# Patient Record
Sex: MALE | Race: WHITE | NOT HISPANIC OR LATINO
[De-identification: names, ages, dates, MRNs, and addresses within clinical notes are randomized per-mention and may not be internally consistent; named-entity substitution may affect disease eponyms.]

---

## 2024-01-22 ENCOUNTER — TRANSCRIBE ORDERS (OUTPATIENT)
Dept: OTHER | Age: 54
End: 2024-01-22

## 2024-01-22 DIAGNOSIS — R97.20 ELEVATED PSA: Primary | ICD-10-CM

## 2024-01-26 NOTE — TELEPHONE ENCOUNTER
MEDICAL RECORDS REQUEST   [facility]  Urology Clinic  [address] [place] [address]  PHONE: [phone]  Fax: [phone]        FUTURE VISIT INFORMATION                                                   Humphrey Chris, : 1970 scheduled for future visit at [facility]    APPOINTMENT INFORMATION:  Date: 2024  Provider:  Domingo Moncada MD  Reason for Visit/Diagnosis: elevated psa    REFERRAL INFORMATION:  Referring provider:  Dr. Marcelino Avalos @ [facility]      RECORDS REQUESTED FOR VISIT                                                     NOTES  STATUS/DETAILS   OFFICE NOTE from referring provider  yes, 2024 -- Dr. Marcelino Avalos affiliated with [facility]   MEDICATION LIST  yes   LABS     URINALYSIS (UA)  yes   PSA  yes     PRE-VISIT CHECKLIST      Joint diagnostic appointment coordinated correctly          (ensure right order & amount of time) Yes   RECORD COLLECTION COMPLETE Yes

## 2024-02-09 ENCOUNTER — PATIENT OUTREACH (OUTPATIENT)
Dept: ONCOLOGY | Facility: CLINIC | Age: 54
End: 2024-02-09

## 2024-02-09 ENCOUNTER — PRE VISIT (OUTPATIENT)
Dept: UROLOGY | Facility: CLINIC | Age: 54
End: 2024-02-09
Payer: COMMERCIAL

## 2024-02-09 ENCOUNTER — OFFICE VISIT (OUTPATIENT)
Dept: ONCOLOGY | Facility: CLINIC | Age: 54
End: 2024-02-09
Attending: UROLOGY
Payer: COMMERCIAL

## 2024-02-09 VITALS
DIASTOLIC BLOOD PRESSURE: 85 MMHG | OXYGEN SATURATION: 99 % | RESPIRATION RATE: 16 BRPM | WEIGHT: 182.2 LBS | TEMPERATURE: 99 F | SYSTOLIC BLOOD PRESSURE: 152 MMHG | BODY MASS INDEX: 26.08 KG/M2 | HEIGHT: 70 IN | HEART RATE: 74 BPM

## 2024-02-09 DIAGNOSIS — R97.20 ELEVATED PSA: ICD-10-CM

## 2024-02-09 PROCEDURE — 99214 OFFICE O/P EST MOD 30 MIN: CPT | Performed by: UROLOGY

## 2024-02-09 PROCEDURE — 99204 OFFICE O/P NEW MOD 45 MIN: CPT | Performed by: UROLOGY

## 2024-02-09 RX ORDER — CALCIPOTRIENE 50 UG/G
OINTMENT TOPICAL
COMMUNITY
Start: 2023-03-08

## 2024-02-09 RX ORDER — FLUTICASONE PROPIONATE 50 MCG
1-2 SPRAY, SUSPENSION (ML) NASAL
COMMUNITY

## 2024-02-09 ASSESSMENT — PAIN SCALES - GENERAL: PAINLEVEL: NO PAIN (0)

## 2024-02-09 NOTE — NURSING NOTE
"Oncology Rooming Note    February 9, 2024 8:23 AM   Humphrey Chris is a 53 year old male who presents for:    Chief Complaint   Patient presents with    Oncology Clinic Visit     Initial Vitals: BP (!) 152/85   Pulse 74   Temp 99  F (37.2  C) (Oral)   Resp 16   Ht 1.778 m (5' 10\")   Wt 82.6 kg (182 lb 3.2 oz)   SpO2 99%   BMI 26.14 kg/m   Estimated body mass index is 26.14 kg/m  as calculated from the following:    Height as of this encounter: 1.778 m (5' 10\").    Weight as of this encounter: 82.6 kg (182 lb 3.2 oz). Body surface area is 2.02 meters squared.  No Pain (0) Comment: Data Unavailable   No LMP for male patient.  Allergies reviewed: Yes  Medications reviewed: Yes    Medications: Medication refills not needed today.  Pharmacy name entered into gdgt: [id number] - [place], [place] - [address] [place] [address]    Frailty Screening:   Is the patient here for a new oncology consult visit in cancer care? 2. No      Clinical concerns: Josue Tsai            "

## 2024-02-09 NOTE — PROGRESS NOTES
Chief Complaint:   Elevated PSA          Consult or Referral:     Mr. Humphrey Chris is a 53 year old male seen in consultation from Dr. Avalos.           History of Present Illness:    Humphrey Chris is a very pleasant 53 year old male who presents with elevated PSA. He reports lower urinary symptoms including weak stream and urgency but is not bothersome. He denies  family history of prostate cancer.    He lives in [place] located [place] of [place].       Past Medical History:   History reviewed. No pertinent past medical history.         Past Surgical History:     Past Surgical History:   Procedure Laterality Date    TUMOR EXCISION      left glutus            Medications     Current Outpatient Medications   Medication    apremilast (OTEZLA) 30 MG tablet    calcipotriene (DOVONOX) 0.005 % external ointment    fluticasone (FLONASE) 50 MCG/ACT nasal spray     No current facility-administered medications for this visit.            Family History:     Family History   Problem Relation Age of Onset    Hypertension Mother     Heart Disease Father     No Known Problems Maternal Grandfather             Social History:     Social History     Socioeconomic History    Marital status: Single     Spouse name: Not on file    Number of children: Not on file    Years of education: Not on file    Highest education level: Not on file   Occupational History    Not on file   Tobacco Use    Smoking status: Never     Passive exposure: Never    Smokeless tobacco: Never   Substance and Sexual Activity    Alcohol use: Yes    Drug use: Never    Sexual activity: Not on file   Other Topics Concern    Not on file   Social History Narrative    Not on file     Social Determinants of Health     Financial Resource Strain: Not on file   Food Insecurity: Not on file   Transportation Needs: Not on file   Physical Activity: Not on file   Stress: Not on file   Social Connections: Not on file   Interpersonal Safety: Not on file  "  Housing Stability: Not on file            Allergies:   Patient has no known allergies.         Review of Systems     10 point ROS of systems including Constitutional, Eyes, Respiratory, Cardiovascular, Gastroenterology, Genitourinary, Integumentary, Muscularskeletal, Psychiatric were all negative except for pertinent positives noted in my HPI.          Physical Exam:     Patient is a 53 year old  male Body mass index is 26.14 kg/m .,  Vitals: Blood pressure (!) 152/85, pulse 74, temperature 99  F (37.2  C), temperature source Oral, resp. rate 16, height 1.778 m (5' 10\"), weight 82.6 kg (182 lb 3.2 oz), SpO2 99%.  General Appearance Adult: Alert, no acute distress, oriented  HENT: throat/mouth:normal, good dentition  Neck: No adenopathy,masses or thyromegaly  Lungs: no respiratory distress, or pursed lip breathing  Heart: No obvious jugular venous distension present  Abdomen: soft, nontender, no organomegaly or masses  Lymphatics: No cervical or supraclavicular adenopathy  Musculoskeltal: extremities normal, no peripheral edema  Skin: no suspicious lesions or rashes  Neuro: Alert, oriented, speech and mentation normal  Psych: affect and mood normal  Gait: Normal  : deferred      Labs and Pathology:    I reviewed all applicable laboratory and pathology data and went over findings with patient  Significant for markedly elevated PSA    Component  Ref Range & Units 4 wk ago   PSA, Total  <=4.00 ng/mL 5.04 High    Resulting Agency Pending sale to [facility]           Imaging:    No relevant imaging to review today          Assessment and Plan:     Assessment:  53-year-old male with slightly elevated PSA    We had a long discussion about the utility of PSA screening.  We talked about the Pros and Cons.  We discussed the findings of the PLCO and EORTC studies.  We discussed the results of the [place] trial of treatment vs. observation in clinically detected prostate cancer as well as the results of the PIVOT " trial in the context of screen-detected cancer.  We discussed the recommendations by the AUA, and USPSTF. We also discussed the significant (2-6%) and rising risk of biopsy associated sepsis.      We also discussed the emerging role of MRI in the diagnosis of prostate cancer and the potential for performing MRI-Ultrasound Fusion biopsy if suspicious lesions are noted.     Plan:  Schedule for prostate MRI   Call with the results to discuss the next steps     45 total minutes spent on the date of the encounter including direct interaction with the patient, performing chart review, documentation and further activities as noted above.        Domingo Moncada MD   Department of Urology   [facility]

## 2024-02-09 NOTE — LETTER
2/9/2024         RE: Humphrey Chris  [address]  [place] [address]        Dear Colleague,    Thank you for referring your patient, Humphrey Chris, to the [facility]. Please see a copy of my visit note below.          Chief Complaint:   Elevated PSA          Consult or Referral:     Mr. Humphrey Chris is a 53 year old male seen in consultation from Dr. Avalos.           History of Present Illness:    Humphrey Chris is a very pleasant 53 year old male who presents with elevated PSA. He reports lower urinary symptoms including weak stream and urgency but is not bothersome. He denies  family history of prostate cancer.    He lives in [place] which is located [place] of [place].       Past Medical History:   History reviewed. No pertinent past medical history.         Past Surgical History:     Past Surgical History:   Procedure Laterality Date     TUMOR EXCISION      left glutus            Medications     Current Outpatient Medications   Medication     apremilast (OTEZLA) 30 MG tablet     calcipotriene (DOVONOX) 0.005 % external ointment     fluticasone (FLONASE) 50 MCG/ACT nasal spray     No current facility-administered medications for this visit.            Family History:     Family History   Problem Relation Age of Onset     Hypertension Mother      Heart Disease Father      No Known Problems Maternal Grandfather             Social History:     Social History     Socioeconomic History     Marital status: Single     Spouse name: Not on file     Number of children: Not on file     Years of education: Not on file     Highest education level: Not on file   Occupational History     Not on file   Tobacco Use     Smoking status: Never     Passive exposure: Never     Smokeless tobacco: Never   Substance and Sexual Activity     Alcohol use: Yes     Drug use: Never     Sexual activity: Not on file   Other Topics Concern     Not on file   Social History  "Narrative     Not on file     Social Determinants of Health     Financial Resource Strain: Not on file   Food Insecurity: Not on file   Transportation Needs: Not on file   Physical Activity: Not on file   Stress: Not on file   Social Connections: Not on file   Interpersonal Safety: Not on file   Housing Stability: Not on file            Allergies:   Patient has no known allergies.         Review of Systems     10 point ROS of systems including Constitutional, Eyes, Respiratory, Cardiovascular, Gastroenterology, Genitourinary, Integumentary, Muscularskeletal, Psychiatric were all negative except for pertinent positives noted in my HPI.          Physical Exam:     Patient is a 53 year old  male Body mass index is 26.14 kg/m .,  Vitals: Blood pressure (!) 152/85, pulse 74, temperature 99  F (37.2  C), temperature source Oral, resp. rate 16, height 1.778 m (5' 10\"), weight 82.6 kg (182 lb 3.2 oz), SpO2 99%.  General Appearance Adult: Alert, no acute distress, oriented  HENT: throat/mouth:normal, good dentition  Neck: No adenopathy,masses or thyromegaly  Lungs: no respiratory distress, or pursed lip breathing  Heart: No obvious jugular venous distension present  Abdomen: soft, nontender, no organomegaly or masses  Lymphatics: No cervical or supraclavicular adenopathy  Musculoskeltal: extremities normal, no peripheral edema  Skin: no suspicious lesions or rashes  Neuro: Alert, oriented, speech and mentation normal  Psych: affect and mood normal  Gait: Normal  : deferred      Labs and Pathology:    I reviewed all applicable laboratory and pathology data and went over findings with patient  Significant for markedly elevated PSA    Component  Ref Range & Units 4 wk ago   PSA, Total  <=4.00 ng/mL 5.04 High    Resulting Agency [facility]           Imaging:    No relevant imaging to review today          Assessment and Plan:     Assessment:  53-year-old male with slightly elevated PSA    We had a long " discussion about the utility of PSA screening.  We talked about the Pros and Cons.  We discussed the findings of the PLCO and EORTC studies.  We discussed the results of the [place] trial of treatment vs. observation in clinically detected prostate cancer as well as the results of the PIVOT trial in the context of screen-detected cancer.  We discussed the recommendations by the AUA, and USPSTF. We also discussed the significant (2-6%) and rising risk of biopsy associated sepsis.      We also discussed the emerging role of MRI in the diagnosis of prostate cancer and the potential for performing MRI-Ultrasound Fusion biopsy if suspicious lesions are noted.     Plan:  Schedule for prostate MRI   Call with the results to discuss the next steps     45 total minutes spent on the date of the encounter including direct interaction with the patient, performing chart review, documentation and further activities as noted above.        Domingo Moncada MD   Department of Urology   [facility]       Again, thank you for allowing me to participate in the care of your patient.        Sincerely,        Domingo Moncada MD

## 2024-02-09 NOTE — PROGRESS NOTES
[facility]:                                                         Writer reached out to [facility] radiology to schedule patient for prostate MRI. Currently they are about a week and a half out for scheduling. Orders faxed over with instructions to call patient to schedule.     Will continue to follow as needed.     Yash Schultz RN, BSN.  RN Care Coordinator     [facility]   [phone]

## 2024-03-01 ENCOUNTER — PATIENT OUTREACH (OUTPATIENT)
Dept: ONCOLOGY | Facility: CLINIC | Age: 54
End: 2024-03-01
Payer: COMMERCIAL

## 2024-03-01 NOTE — TELEPHONE ENCOUNTER
[facility]:                                                                         Writer reached out to patient today to discuss recent prostate MRI results. Upon review by Dr. Moncada nothing abnormal with scan and patient should continue to monitor PSA with PCP yearly. Patient to call if rising PSA or any concerns. Patient had no further questions or concerns today.     Will continue to follow as needed.     Yash Schultz, RN, BSN.  RN Care Coordinator     [facility]   [phone]

## 2025-01-30 ENCOUNTER — TRANSCRIBE ORDERS (OUTPATIENT)
Dept: OTHER | Age: 55
End: 2025-01-30

## 2025-01-30 ENCOUNTER — MEDICAL CORRESPONDENCE (OUTPATIENT)
Dept: HEALTH INFORMATION MANAGEMENT | Facility: CLINIC | Age: 55
End: 2025-01-30
Payer: COMMERCIAL

## 2025-01-30 DIAGNOSIS — R97.20 ELEVATED PSA: Primary | ICD-10-CM

## 2025-02-24 ENCOUNTER — PRE VISIT (OUTPATIENT)
Dept: UROLOGY | Facility: CLINIC | Age: 55
End: 2025-02-24
Payer: COMMERCIAL

## 2025-02-24 NOTE — TELEPHONE ENCOUNTER
Reason for visit: Consult - referred by Dr. Benson    Dx/Hx/Sx: elevated PSA     Records/imaging/labs/orders: in EPIC     At Rooming: standard rooming    Bri Briones LPN on 2/24/2025 at 12:53 PM

## 2025-02-26 ENCOUNTER — OFFICE VISIT (OUTPATIENT)
Dept: UROLOGY | Facility: CLINIC | Age: 55
End: 2025-02-26
Attending: FAMILY MEDICINE
Payer: COMMERCIAL

## 2025-02-26 ENCOUNTER — LAB (OUTPATIENT)
Dept: LAB | Facility: CLINIC | Age: 55
End: 2025-02-26
Payer: COMMERCIAL

## 2025-02-26 VITALS — HEART RATE: 67 BPM | SYSTOLIC BLOOD PRESSURE: 154 MMHG | DIASTOLIC BLOOD PRESSURE: 84 MMHG | OXYGEN SATURATION: 97 %

## 2025-02-26 DIAGNOSIS — R97.20 ELEVATED PSA: ICD-10-CM

## 2025-02-26 LAB — PSA SERPL DL<=0.01 NG/ML-MCNC: 3.99 NG/ML (ref 0–3.5)

## 2025-02-26 PROCEDURE — 3079F DIAST BP 80-89 MM HG: CPT | Performed by: UROLOGY

## 2025-02-26 PROCEDURE — 36415 COLL VENOUS BLD VENIPUNCTURE: CPT | Performed by: PATHOLOGY

## 2025-02-26 PROCEDURE — 99215 OFFICE O/P EST HI 40 MIN: CPT | Performed by: UROLOGY

## 2025-02-26 PROCEDURE — 3077F SYST BP >= 140 MM HG: CPT | Performed by: UROLOGY

## 2025-02-26 PROCEDURE — 84153 ASSAY OF PSA TOTAL: CPT | Performed by: PATHOLOGY

## 2025-02-26 PROCEDURE — 1126F AMNT PAIN NOTED NONE PRSNT: CPT | Performed by: UROLOGY

## 2025-02-26 RX ORDER — CEPHALEXIN 500 MG/1
1 CAPSULE ORAL
COMMUNITY
Start: 2024-12-03

## 2025-02-26 ASSESSMENT — PAIN SCALES - GENERAL: PAINLEVEL_OUTOF10: NO PAIN (0)

## 2025-02-26 NOTE — NURSING NOTE
Chief Complaint   Patient presents with    Elevated PSA     Recent elevated PSA at 6.17  No recent fevers or infections.        Blood pressure today is elevated. Patient attributes this to stress of coming into the clinic today.  Patient states baseline blood pressure is usually within normal limits   Patient denies chest pain, headache, shortness of breath, and vision changes.  Patient is not concerned for elevated reading today.  Patient  declined repeat blood pressure check today.       Blood pressure (!) 154/84, pulse 67, SpO2 97%. There is no height or weight on file to calculate BMI.    There is no problem list on file for this patient.      No Known Allergies    Current Outpatient Medications   Medication Sig Dispense Refill    apremilast (OTEZLA) 30 MG tablet Take 30 mg by mouth 2 times daily      calcipotriene (DOVONOX) 0.005 % external ointment Apply to psoriasis twice daily      cephALEXin (KEFLEX) 500 MG capsule Take 1 tablet by mouth 2 times daily.      fluticasone (FLONASE) 50 MCG/ACT nasal spray Spray 1-2 sprays in nostril         Social History     Tobacco Use    Smoking status: Never     Passive exposure: Never    Smokeless tobacco: Never   Substance Use Topics    Alcohol use: Yes    Drug use: Never       Tracy [place]  2/26/2025  11:40 AM

## 2025-02-26 NOTE — PROGRESS NOTES
Urology Clinic     HPI  Humphrey Chris is a 54 year old male with history of elevated PSA, here for follow-up.      The patient denies any new urinary symptoms.  No changes to health, hospitalizations or new diagnoses in the interim     PHYSICAL EXAM  BP (!) 154/84 (BP Location: Right arm, Patient Position: Sitting, Cuff Size: Adult Regular)   Pulse 67   SpO2 97%    Constitutional: AO, pleasant, NAD  Resp: Non-labored breathing on room air  Abd: Soft, NT, ND  TOY: Deferred     Labs  PSA, Total  <=4.00 ng/mL 6.17 High        Imaging   No new imaging to review      ASSESSMENT AND PLAN  54 year old male for followup of elevated PSA     I told Humphrey that the PSA rise is not significant and given his prior negative MRI, I do believe that the risk of clinically significant prostate cancer is still very small.  We will repeat the PSA and we will consider regular biopsy after significant rising PSA number but would consider screening if PSA number remains stable.  He is agreeable.          Plan   Repeat PSA  Call with the result    40 total minutes spent on the date of the encounter including direct interaction with the patient, performing chart review, documentation and further activities as noted above    Domingo Moncada MD   Department of Urology   [facility]